# Patient Record
Sex: MALE | Race: WHITE | NOT HISPANIC OR LATINO | URBAN - METROPOLITAN AREA
[De-identification: names, ages, dates, MRNs, and addresses within clinical notes are randomized per-mention and may not be internally consistent; named-entity substitution may affect disease eponyms.]

---

## 2018-04-24 ENCOUNTER — OFFICE VISIT - HEALTHEAST (OUTPATIENT)
Dept: INTERNAL MEDICINE | Facility: CLINIC | Age: 28
End: 2018-04-24

## 2018-04-24 DIAGNOSIS — F41.9 ANXIETY: ICD-10-CM

## 2018-05-10 ENCOUNTER — OFFICE VISIT - HEALTHEAST (OUTPATIENT)
Dept: PSYCHOLOGY | Facility: CLINIC | Age: 28
End: 2018-05-10

## 2018-05-10 ENCOUNTER — COMMUNICATION - HEALTHEAST (OUTPATIENT)
Dept: PSYCHOLOGY | Facility: CLINIC | Age: 28
End: 2018-05-10

## 2018-05-10 DIAGNOSIS — F41.1 GENERALIZED ANXIETY DISORDER: ICD-10-CM

## 2018-06-25 ENCOUNTER — COMMUNICATION - HEALTHEAST (OUTPATIENT)
Dept: SCHEDULING | Facility: CLINIC | Age: 28
End: 2018-06-25

## 2020-02-07 ENCOUNTER — COMMUNICATION - HEALTHEAST (OUTPATIENT)
Dept: SCHEDULING | Facility: CLINIC | Age: 30
End: 2020-02-07

## 2020-10-02 ENCOUNTER — COMMUNICATION - HEALTHEAST (OUTPATIENT)
Dept: SCHEDULING | Facility: CLINIC | Age: 30
End: 2020-10-02

## 2020-10-05 ENCOUNTER — OFFICE VISIT - HEALTHEAST (OUTPATIENT)
Dept: INTERNAL MEDICINE | Facility: CLINIC | Age: 30
End: 2020-10-05

## 2020-10-05 DIAGNOSIS — K64.0 GRADE I HEMORRHOIDS: ICD-10-CM

## 2020-10-05 DIAGNOSIS — K64.4 EXTERNAL HEMORRHOIDS: ICD-10-CM

## 2020-10-05 DIAGNOSIS — K64.9 ACUTE HEMORRHOID: ICD-10-CM

## 2020-10-05 ASSESSMENT — PATIENT HEALTH QUESTIONNAIRE - PHQ9: SUM OF ALL RESPONSES TO PHQ QUESTIONS 1-9: 3

## 2021-02-05 ENCOUNTER — COMMUNICATION - HEALTHEAST (OUTPATIENT)
Dept: SCHEDULING | Facility: CLINIC | Age: 31
End: 2021-02-05

## 2021-02-05 ENCOUNTER — COMMUNICATION - HEALTHEAST (OUTPATIENT)
Dept: INTERNAL MEDICINE | Facility: CLINIC | Age: 31
End: 2021-02-05

## 2021-05-25 ENCOUNTER — RECORDS - HEALTHEAST (OUTPATIENT)
Dept: ADMINISTRATIVE | Facility: CLINIC | Age: 31
End: 2021-05-25

## 2021-05-26 ENCOUNTER — RECORDS - HEALTHEAST (OUTPATIENT)
Dept: ADMINISTRATIVE | Facility: CLINIC | Age: 31
End: 2021-05-26

## 2021-05-27 ASSESSMENT — PATIENT HEALTH QUESTIONNAIRE - PHQ9: SUM OF ALL RESPONSES TO PHQ QUESTIONS 1-9: 3

## 2021-06-01 VITALS — WEIGHT: 144.5 LBS | BODY MASS INDEX: 19.06 KG/M2

## 2021-06-04 VITALS
SYSTOLIC BLOOD PRESSURE: 130 MMHG | DIASTOLIC BLOOD PRESSURE: 70 MMHG | WEIGHT: 145 LBS | BODY MASS INDEX: 19.13 KG/M2 | HEART RATE: 64 BPM

## 2021-06-05 NOTE — TELEPHONE ENCOUNTER
For the past 2 days he has had congestion and sore throat    102.4    Body aches    Fever started last night    Cough    Breathing ok    No chronic health conditions    Home treatments given and told to call back with any concerns.    Jeane Boyce RN   [facility] Refill and Triage Nurse  8:12 AM  2/7/2020    Reason for Disposition    Probable mild influenza (no fever) or a common cold with no complications and not HIGH RISK    Protocols used: INFLUENZA - SEASONAL-A-OH

## 2021-06-12 NOTE — TELEPHONE ENCOUNTER
"Triage Call:  Has a hemorrhoid for about a week  Putting bacitracin on it and using preparation-H  wipes, slightly better, but no improving for the last few days. Pt made an appointment for Monday, No bleeding. Started with a \"pinker\" color but now paler pink in color, nickel-jayy in size, no pain, no fever, slightly stings with bowel movement, stated occassionally itches but he does not itch it. Pt thinks he may have had a hemorrhoid before.    Gave disposition for today, Pt reported that he worked and is not able to make it. Pt was given Care advice and advised to call back if condition worsens or bleeding occurs. Pt is keeping the appointment for Monday.     Lupe Baptiste RN Nurse Triage 10/2/2020 8:05 AM     COVID 19 Nurse Triage Plan/Patient Instructions    Please be aware that novel coronavirus (COVID-19) may be circulating in the community. If you develop symptoms such as fever, cough, or SOB or if you have concerns about the presence of another infection including coronavirus (COVID-19), please contact your health care provider or visit [email or web address].     Disposition/Instructions    In-Person Visit with provider recommended. Reference Visit Selection Guide.    Thank you for taking steps to prevent the spread of this virus.  o Limit your contact with others.  o Wear a simple mask to cover your cough.  o Wash your hands well and often.    Resources    [facility]: About COVID-19: [email or web address]    CDC: What to Do If You're Sick: www.cdc.gov/coronavirus/2019-ncov/about/steps-when-sick.html    CDC: Ending Home Isolation: www.cdc.gov/coronavirus/2019-ncov/hcp/disposition-in-home-patients.html     CDC: Caring for Someone: www.cdc.gov/coronavirus/2019-ncov/if-you-are-sick/care-for-someone.html     [facility]: Interim Guidance for Hospital Discharge to Home: www.health.[facility].[place]/diseases/coronavirus/hcp/hospdischarge.pdf    [facility] clinical trials (COVID-19 research studies): " clinicalaffairs.[email or web address]-clinical-trials     Below are the COVID-19 hotlines at the [place] [facility] ([facility]). Interpreters are available.   o For health questions: Call 550-090-9693 or 1-473.152.9014 (7 a.m. to 7 p.m.)  o For questions about schools and childcare: Call 072-976-4931 or 1-509.312.6610 (7 a.m. to 7 p.m.)         Reason for Disposition    Home treatment for > 3 days for rectal itching and not improved    Additional Information    Negative: Sounds like a life-threatening emergency to the triager    Negative: Blood in or on bowel movement is the main symptom    Negative: Constipation is the main symptom (e.g., pain or discomfort caused by passage of hard BMs)    Negative: Diarrhea is the main symptom    Negative: Sexual assault    Negative: Injury to rectum    Negative: Patient sounds very sick or weak to the triager    Negative: Severe rectal pain    Negative: Rectal pain or redness and fever > 100.5 F (38.1 C)    Negative: Acute onset rectal pain and constipation (straining with rectal pressure or fullness), which is not relieved by Sitz bath or suppository    Negative: Caller is worried about a sexually transmitted disease (STD)    Negative: Rash of rectal area (e.g., open sore, painful tiny water blisters, unexplained bumps)    Negative: Rectal area looks infected (e.g., draining sore, spreading redness)    Negative: Last bowel movement (BM) > 4 days ago    Negative: MODERATE-SEVERE rectal itching (i.e., interferes with school, work, or sleep)    Negative: MODERATE-SEVERE rectal pain (i.e., interferes with school, work, or sleep)    Protocols used: RECTAL SYMPTOMS-A-OH

## 2021-06-12 NOTE — PROGRESS NOTES
Assessment/Plan:        Problem List Items Addressed This Visit     None      Visit Diagnoses     Grade I hemorrhoids, patient has increased fiber, and does not have current constipation, no pain on defecation, also does not have any bleeding.  He is noticed this swelling for the past 10 days, and feels that this is decreased in size.  He has been using witch hazel.  I have prescribed some Anusol for him, to decrease inflammation.  I have asked him to let us know if this does not improve, persists, or if he develops any pain.    -  Primary    External hemorrhoids        Relevant Medications    hydrocortisone (ANUSOL-HC) 2.5 % rectal cream    Acute hemorrhoid                  Subjective:    Patient ID: Sidney Romero is a 30 y.o. male.    HPI   Here with a complaint of hemorrhoids.  This time he has noticed this, on 28 days ago.  He noticed a bump.  Initially this was more red in color, now skin colored.  He has not had any bleeding, he does not have any pain.  He is increased fiber, and does not feel that he has any constipation currently.  He has been using witch hazel wipes, and has also been using bacitracin ointment [discussed with him that the bacitracin is not needed].      Review of Systems   Bump as noted above, no bleeding, no constipation, no pain.  No fever.        /70 (Patient Site: Right Arm, Patient Position: Sitting, Cuff Size: Adult Regular)   Pulse 64   Wt 145 lb (65.8 kg)   BMI 19.13 kg/m      Objective:    Physical Exam   Patient is not distressed.  Rectal examination: He has an external tag.  Internal hemorrhoid, long line leading to external tag. No prolapse. No fissure, no infection of anus.

## 2021-06-15 NOTE — TELEPHONE ENCOUNTER
Who is calling:  Patient   Reason for Call:  Per triage/ER advice/patient called was not happy that I was unable to schedule in person visit due to his fever/patient said choice words and hung up.   Date of last appointment with primary care: NA  Okay to leave a detailed message: No

## 2021-06-15 NOTE — TELEPHONE ENCOUNTER
He has a cough and fever, Tested negative for coronavirus earlier this week. He feels a rattling in his chest and has shortness of breath at rest. He doesn't want to go to the ER so I connected him with scheduling for an appointment instead.  Marta Hinojosa RN  [facility]      Reason for Disposition    MODERATE difficulty breathing (e.g., speaks in phrases, SOB even at rest, pulse 100-120)    Additional Information    Negative: SEVERE difficulty breathing (e.g., struggling for each breath, speaks in single words)    Negative: Difficult to awaken or acting confused (e.g., disoriented, slurred speech)    Negative: Bluish (or gray) lips or face now    Negative: Shock suspected (e.g., cold/pale/clammy skin, too weak to stand, low BP, rapid pulse)    Negative: Sounds like a life-threatening emergency to the triager    Negative: [1] COVID-19 exposure AND [2] no symptoms    Negative: [1] Lives with someone known to have influenza (flu test positive) AND [2] flu-like symptoms (e.g., cough, runny nose, sore throat, SOB; with or without fever)    Negative: [1] Adult with possible COVID-19 symptoms AND [2] triager concerned about severity of symptoms or other causes    Negative: COVID-19 vaccine reaction suspected (e.g., fever, headache, muscle aches) occurring during days 1-3 after getting vaccine    Negative: COVID-19 vaccine, questions about    Negative: COVID-19 and breastfeeding, questions about    Negative: SEVERE or constant chest pain or pressure (Exception: mild central chest pain, present only when coughing)    Protocols used: CORONAVIRUS (COVID-19) DIAGNOSED OR HCSCOXFCY-P-EO 1.3.21

## 2021-06-17 NOTE — PROGRESS NOTES
Clinic Note    Assessment:     Assessment and Plan:  1. Anxiety  I wrote up a letter of medical necessity so that he could have his cath when he moves into a new place.  I also put in a referral to health psychology so that he could meet with someone for individual psychotherapy sessions given his problems with anxiety.  Follow-up as needed.     Patient Instructions   I do not see any reason why he cannot have your cat when living in a new place.    I placed an order for you to meet with our health psychologist.    Call [phone] to schedule appointment with her.    If you need anything on a short-term basis you can always schedule appointment with me.    No Follow-up on file.         Subjective:      Sidney Romero is a 27 y.o. male who comes to the clinic for a letter for an emotional support animal.     Patient states that he needs a letter stating that he can have his cat for emotional support while he moves into a new place. He says that his cat has been especially beneficial for him in terms of dealing with some stresses of work and room mate drama. He found the kitten while at work. The car has helped him get energy to do things again. He had a reason to come home and take care of her. He is living with his parents right now. He does not get along well with his dad, which seems to be paired with work in the source of his stress. He likes to take care of her. 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 The cat helps him unwind when he gets home from work. He would like to get a letter so that he can keep her while he looks for a new place.     He has always been kind of anxious. He wonders if he dealt with it poorly in the past. He says that he ignores his problems and worries about other things. He went to a therapist after a friend committed suicide in high school. He has never been on medication for depression or anxiety. He thinks that he may have had an anxiety attack in the  past. He was with his parents when the family dog ran away causing extreme shortness of breath and nausea.     The following portions of the patient's history were reviewed and updated as appropriate: Allergies, medications, problems, prior note.    Review of Systems:    Review is negative except for what is mentioned above.     Social Hx:    History   Smoking Status     Former Smoker     Packs/day: 0.10     Years: 4.00   Smokeless Tobacco     Never Used         Objective:     Vitals:    04/24/18 1523   BP: 130/88   Pulse: 65   SpO2: 98%   Weight: 144 lb 8 oz (65.5 kg)       Exam:    General: No apparent distress. Calm. Alert and Oriented X3. Pt behavior is appropriate.      Total time spent with patient was 25 minutes with >50% of time spent in face-to-face counseling regarding the above plan       Raheem Juarez (Rob), SUDHIR    4/24/2018